# Patient Record
Sex: FEMALE | Race: WHITE
[De-identification: names, ages, dates, MRNs, and addresses within clinical notes are randomized per-mention and may not be internally consistent; named-entity substitution may affect disease eponyms.]

---

## 2018-04-30 ENCOUNTER — HOSPITAL ENCOUNTER (EMERGENCY)
Dept: HOSPITAL 45 - C.EDB | Age: 28
Discharge: HOME | End: 2018-04-30
Payer: SELF-PAY

## 2018-04-30 VITALS
HEIGHT: 67.99 IN | BODY MASS INDEX: 23.82 KG/M2 | HEIGHT: 67.99 IN | BODY MASS INDEX: 23.82 KG/M2 | WEIGHT: 157.19 LBS | WEIGHT: 157.19 LBS

## 2018-04-30 VITALS — TEMPERATURE: 98.6 F | DIASTOLIC BLOOD PRESSURE: 82 MMHG | SYSTOLIC BLOOD PRESSURE: 144 MMHG

## 2018-04-30 VITALS — OXYGEN SATURATION: 99 % | HEART RATE: 82 BPM

## 2018-04-30 DIAGNOSIS — Z79.3: ICD-10-CM
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
## 2018-04-30 DIAGNOSIS — Z23: ICD-10-CM
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
## 2018-04-30 DIAGNOSIS — F17.210: ICD-10-CM

## 2018-04-30 DIAGNOSIS — S01.81XA: Primary | ICD-10-CM
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
## 2018-04-30 NOTE — DIAGNOSTIC IMAGING REPORT
HEAD WITHOUT CONTRAST (CT)



CT DOSE: 537.48 mGy.cm



HISTORY: Trauma. Nausea.  head injury, nausea, ?loc



TECHNIQUE: Multiaxial CT images of the head were performed without the use of

intravenous contrast.  A dose lowering technique was utilized adhering to the

principles of ALARA.





Comparison: None.



Findings: The paranasal sinuses and mastoid air cells are clear. The calvarium

and skull base are intact. The ventricles and sulci are within normal limits.

There is no mass, hematoma, midline shift, or acute infarct.



Impression:

No acute intracranial abnormality. 











The above report was generated using voice recognition software.  It may contain

grammatical, syntax or spelling errors.







Electronically signed by:  Venkatesh Fischer M.D.

4/30/2018 7:07 PM



Dictated Date/Time:  4/30/2018 7:05 PM